# Patient Record
Sex: MALE | Race: WHITE | URBAN - NONMETROPOLITAN AREA
[De-identification: names, ages, dates, MRNs, and addresses within clinical notes are randomized per-mention and may not be internally consistent; named-entity substitution may affect disease eponyms.]

---

## 2021-08-25 ENCOUNTER — OFFICE VISIT (OUTPATIENT)
Dept: URBAN - NONMETROPOLITAN AREA CLINIC 13 | Facility: CLINIC | Age: 71
End: 2021-08-25
Payer: MEDICARE

## 2021-08-25 ENCOUNTER — WEB ENCOUNTER (OUTPATIENT)
Dept: URBAN - NONMETROPOLITAN AREA CLINIC 13 | Facility: CLINIC | Age: 71
End: 2021-08-25

## 2021-08-25 ENCOUNTER — LAB OUTSIDE AN ENCOUNTER (OUTPATIENT)
Dept: URBAN - NONMETROPOLITAN AREA CLINIC 13 | Facility: CLINIC | Age: 71
End: 2021-08-25

## 2021-08-25 VITALS
DIASTOLIC BLOOD PRESSURE: 71 MMHG | SYSTOLIC BLOOD PRESSURE: 127 MMHG | HEART RATE: 89 BPM | HEIGHT: 71 IN | BODY MASS INDEX: 26.99 KG/M2 | WEIGHT: 192.8 LBS

## 2021-08-25 DIAGNOSIS — K86.89 PANCREATIC CALCIFICATION: ICD-10-CM

## 2021-08-25 DIAGNOSIS — K65.4 MESENTERIC PANNICULITIS: ICD-10-CM

## 2021-08-25 DIAGNOSIS — Z86.010 PERSONAL HISTORY OF COLONIC POLYPS: ICD-10-CM

## 2021-08-25 DIAGNOSIS — R93.5 ABNORMAL COMPUTED TOMOGRAPHY OF ABDOMEN AND PELVIS: ICD-10-CM

## 2021-08-25 DIAGNOSIS — K76.0 HEPATIC STEATOSIS: ICD-10-CM

## 2021-08-25 PROCEDURE — 99204 OFFICE O/P NEW MOD 45 MIN: CPT | Performed by: INTERNAL MEDICINE

## 2021-08-25 RX ORDER — ATORVASTATIN CALCIUM 80 MG/1
TAKE ONE TABLET BY MOUTH ONE TIME DAILY TABLET, FILM COATED ORAL
Qty: 90 | Refills: 0 | Status: ACTIVE | COMMUNITY

## 2021-08-25 RX ORDER — TAMSULOSIN HYDROCHLORIDE 0.4 MG/1
TAKE ONE CAPSULE BY MOUTH EVERY EVENING CAPSULE ORAL
Qty: 90 | Refills: 1 | Status: ACTIVE | COMMUNITY

## 2021-08-25 RX ORDER — LISINOPRIL 10 MG/1
TAKE ONE TABLET BY MOUTH ONE TIME DAILY TABLET ORAL
Qty: 90 | Refills: 1 | Status: ACTIVE | COMMUNITY

## 2021-08-25 RX ORDER — FENOFIBRATE 160 MG/1
TAKE ONE TABLET BY MOUTH ONE TIME DAILY TABLET ORAL
Qty: 90 | Refills: 1 | Status: ACTIVE | COMMUNITY

## 2021-08-25 RX ORDER — DILTIAZEM HYDROCHLORIDE 180 MG/1
TAKE ONE CAPSULE BY MOUTH ONE TIME DAILY FOR 90 DAYS CAPSULE, EXTENDED RELEASE ORAL
Qty: 90 | Refills: 1 | Status: ACTIVE | COMMUNITY

## 2021-08-25 NOTE — HPI-TODAY'S VISIT:
2021: Initial Gastroenterology Clinic Visit   Mr. Gallegos is a 71 year old gentleman with past medical history of BPH, hypertension, hyperlipidemia, nephrolithiasis, personal history of colon polyps, perforated colon (unclear etiology) s/p colon resection, who presents for abnormal CT Abdomen/Pelvis study.   Mr. Gallegos was in his usual state of health until 2021 when he began to experience abdominal pain, abdominal fullness, bloating and losoe bowel movements.   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 This is typically a self-limiting process. Otherwise no acute inflammatory process in the abdomen or pelvis. Hepatic steatosis. There are a few punctate calcifications in the pancreatic head parenchyma which may are present sequela of chronic pancreatitis." Of note, the Radiology team did acknowledge that the findings on the CT could be secondary to an enteritis.   Following the CT scan, Mr. Gallegos was prescribed antibiotics. He notes that his abdominal symptoms have significantly improved completing the antibiotics.  In regard to the pancreatic calcifications at the pancreatic head, Mr. Gallegos denies history of pancreatitis. He denies significant alcohol use. He denies tobacco use.   His last colonoscopy was 10 years ago. He believes he did have colon polyps at that time.   He does experience reflux which is improved with as needed Tums.  He is undergoing cardiac evaluation for lightheadedness and dizziness.   He currently onws a printing company.   Prior Abdominal Imagin2021: CT Abdomen and Pelvis with Contrast IMPRESSION: 1.  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 This is typically a self-limiting process. Otherwise no acute inflammatory process in the abdomen or pelvis. 2.  Hepatic steatosis. 3.  Nonobstructing nephrolithiasis.

## 2021-08-26 LAB
A/G RATIO: 2.4
ALBUMIN: 4.6
ALKALINE PHOSPHATASE: 34
ALT (SGPT): 34
AST (SGOT): 33
BASO (ABSOLUTE): 0.1
BASOS: 1
BILIRUBIN, TOTAL: 0.3
BUN/CREATININE RATIO: 16
BUN: 25
C-REACTIVE PROTEIN, QUANT: 1
CALCIUM: 10
CARBON DIOXIDE, TOTAL: 23
CHLORIDE: 101
CREATININE: 1.58
EGFR IF AFRICN AM: 50
EGFR IF NONAFRICN AM: 43
EOS (ABSOLUTE): 0.1
EOS: 1
GLOBULIN, TOTAL: 1.9
GLUCOSE: 97
HEMATOCRIT: 39.4
HEMATOLOGY COMMENTS:: (no result)
HEMOGLOBIN: 13
IMMATURE CELLS: (no result)
IMMATURE GRANS (ABS): 0.1
IMMATURE GRANULOCYTES: 1
LYMPHS (ABSOLUTE): 2.1
LYMPHS: 24
MCH: 31.6
MCHC: 33
MCV: 96
MONOCYTES(ABSOLUTE): 0.9
MONOCYTES: 10
NEUTROPHILS (ABSOLUTE): 5.4
NEUTROPHILS: 63
NRBC: (no result)
PLATELETS: 284
POTASSIUM: 4.5
PROTEIN, TOTAL: 6.5
RBC: 4.12
RDW: 13.2
SODIUM: 139
WBC: 8.5

## 2021-09-16 ENCOUNTER — OFFICE VISIT (OUTPATIENT)
Dept: URBAN - METROPOLITAN AREA CLINIC 82 | Facility: CLINIC | Age: 71
End: 2021-09-16

## 2021-09-24 ENCOUNTER — TELEPHONE ENCOUNTER (OUTPATIENT)
Dept: URBAN - NONMETROPOLITAN AREA CLINIC 2 | Facility: CLINIC | Age: 71
End: 2021-09-24

## 2021-10-29 ENCOUNTER — OFFICE VISIT (OUTPATIENT)
Dept: URBAN - METROPOLITAN AREA MEDICAL CENTER 1 | Facility: MEDICAL CENTER | Age: 71
End: 2021-10-29
Payer: MEDICARE

## 2021-10-29 DIAGNOSIS — R93.2 ABN FIND-BILIARY TRACT: ICD-10-CM

## 2021-10-29 PROCEDURE — 43259 EGD US EXAM DUODENUM/JEJUNUM: CPT | Performed by: INTERNAL MEDICINE

## 2021-11-02 ENCOUNTER — TELEPHONE ENCOUNTER (OUTPATIENT)
Dept: URBAN - NONMETROPOLITAN AREA CLINIC 2 | Facility: CLINIC | Age: 71
End: 2021-11-02

## 2021-11-12 ENCOUNTER — LAB OUTSIDE AN ENCOUNTER (OUTPATIENT)
Dept: URBAN - NONMETROPOLITAN AREA CLINIC 2 | Facility: CLINIC | Age: 71
End: 2021-11-12

## 2021-11-12 ENCOUNTER — OFFICE VISIT (OUTPATIENT)
Dept: URBAN - NONMETROPOLITAN AREA CLINIC 2 | Facility: CLINIC | Age: 71
End: 2021-11-12
Payer: MEDICARE

## 2021-11-12 ENCOUNTER — TELEPHONE ENCOUNTER (OUTPATIENT)
Dept: URBAN - NONMETROPOLITAN AREA CLINIC 2 | Facility: CLINIC | Age: 71
End: 2021-11-12

## 2021-11-12 VITALS
SYSTOLIC BLOOD PRESSURE: 148 MMHG | BODY MASS INDEX: 25.9 KG/M2 | DIASTOLIC BLOOD PRESSURE: 76 MMHG | WEIGHT: 185 LBS | HEART RATE: 64 BPM | HEIGHT: 71 IN

## 2021-11-12 DIAGNOSIS — D3A.8 PRIMARY PANCREATIC NEUROENDOCRINE TUMOR: ICD-10-CM

## 2021-11-12 DIAGNOSIS — K76.0 HEPATIC STEATOSIS: ICD-10-CM

## 2021-11-12 DIAGNOSIS — Z86.010 PERSONAL HISTORY OF COLONIC POLYPS: ICD-10-CM

## 2021-11-12 DIAGNOSIS — K65.4 MESENTERIC PANNICULITIS: ICD-10-CM

## 2021-11-12 PROCEDURE — 99214 OFFICE O/P EST MOD 30 MIN: CPT | Performed by: INTERNAL MEDICINE

## 2021-11-12 RX ORDER — LISINOPRIL 10 MG/1
TAKE ONE TABLET BY MOUTH ONE TIME DAILY TABLET ORAL
Qty: 90 | Refills: 1 | Status: ACTIVE | COMMUNITY

## 2021-11-12 RX ORDER — FENOFIBRATE 160 MG/1
TAKE ONE TABLET BY MOUTH ONE TIME DAILY TABLET ORAL
Qty: 90 | Refills: 1 | Status: ACTIVE | COMMUNITY

## 2021-11-12 RX ORDER — ATORVASTATIN CALCIUM 80 MG/1
TAKE ONE TABLET BY MOUTH ONE TIME DAILY TABLET, FILM COATED ORAL
Qty: 90 | Refills: 0 | Status: ACTIVE | COMMUNITY

## 2021-11-12 RX ORDER — TAMSULOSIN HYDROCHLORIDE 0.4 MG/1
TAKE ONE CAPSULE BY MOUTH EVERY EVENING CAPSULE ORAL
Qty: 90 | Refills: 1 | Status: ACTIVE | COMMUNITY

## 2021-11-12 RX ORDER — POLYETHYLENE GLYCOL 3350, SODIUM SULFATE, SODIUM CHLORIDE, POTASSIUM CHLORIDE, ASCORBIC ACID, SODIUM ASCORBATE 140-9-5.2G
AS DIRECTED KIT ORAL AS DIRECTED
Qty: 280 GRAM | Refills: 0 | OUTPATIENT
Start: 2021-11-12 | End: 2021-11-13

## 2021-11-12 RX ORDER — DILTIAZEM HYDROCHLORIDE 180 MG/1
TAKE ONE CAPSULE BY MOUTH ONE TIME DAILY FOR 90 DAYS CAPSULE, EXTENDED RELEASE ORAL
Qty: 90 | Refills: 1 | Status: ACTIVE | COMMUNITY

## 2021-11-12 NOTE — HPI-TODAY'S VISIT:
2021: Initial Gastroenterology Clinic Visit     Mr. Gallegos is a 71 year old gentleman with past medical history of BPH, hypertension, hyperlipidemia, nephrolithiasis, personal history of colon polyps, perforated colon (unclear etiology) s/p colon resection, who presents for abnormal CT Abdomen/Pelvis study.   Mr. Gallegos was in his usual state of health until 2021 when he began to experience abdominal pain, abdominal fullness, bloating and loose bowel movements.   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 This is typically a self-limiting process. Otherwise no acute inflammatory process in the abdomen or pelvis. Hepatic steatosis. There are a few punctate calcifications in the pancreatic head parenchyma which may are present sequela of chronic pancreatitis." Of note, the Radiology team did acknowledge that the findings on the CT could be secondary to an enteritis.   Following the CT scan, Mr. Gallegos was prescribed antibiotics. He notes that his abdominal symptoms have significantly improved completing the antibiotics.  In regard to the pancreatic calcifications at the pancreatic head, Mr. Gallegos denies history of pancreatitis. He denies significant alcohol use. He denies tobacco use.   His last colonoscopy was 10 years ago. He believes he did have colon polyps at that time.   He does experience reflux which is improved with as needed Tums.  He is undergoing cardiac evaluation for lightheadedness and dizziness.   He currently onws a printing company.   Prior Abdominal Imagin2021: CT Abdomen and Pelvis with Contrast IMPRESSION: 1.  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 This is typically a self-limiting process. Otherwise no acute inflammatory process in the abdomen or pelvis. 2.  Hepatic steatosis.  3.  Nonobstructing nephrolithiasis.   2021: WBC 8.5, hemoglobin 13.0, hematocrit 39.4, platelets 284. Chemistry panl notable for creatinine 1.58. CRP normal.  2021: MRCP IMPRESSION: 1. 5 mm hyperenhancing lesion in the uncinate process of the pancreas is highly suspicious for a neuroendocrine tumor. This is much better seen on the CT scan from 2021 and only evident on the diffusion weighted images and fat sat T2/STIR of this MRI.    2. Hepatic steatosis. 10/29/2021: Endoscopic Ultrasound POST OP DIAGNOSIS: 1.  No  evidence of neuroendocrine tumor/mass in the visualized portions of the pancreas 2.  Nonspecific hyperechoic strands and foci without calcifications or ductal dilation of the pancreas 3.  Normal upper bile duct and pancreatic duct  2021: Gastroenterology Follow-Up Visit Mr. Gallegos's abdominal symptoms have completely resolved. He denies abdominal pain. He denies weight loss.

## 2021-11-12 NOTE — PHYSICAL EXAM SKIN:
prior surgical scars are well-healed , no jaundice present , good turgor , no masses , no tenderness on palpation

## 2021-11-16 ENCOUNTER — TELEPHONE ENCOUNTER (OUTPATIENT)
Dept: URBAN - NONMETROPOLITAN AREA CLINIC 2 | Facility: CLINIC | Age: 71
End: 2021-11-16

## 2021-11-20 ENCOUNTER — TELEPHONE ENCOUNTER (OUTPATIENT)
Dept: URBAN - NONMETROPOLITAN AREA CLINIC 2 | Facility: CLINIC | Age: 71
End: 2021-11-20

## 2021-11-20 PROBLEM — 15634181000119107: Status: ACTIVE | Noted: 2021-08-29

## 2022-01-18 ENCOUNTER — TELEPHONE ENCOUNTER (OUTPATIENT)
Dept: URBAN - NONMETROPOLITAN AREA CLINIC 2 | Facility: CLINIC | Age: 72
End: 2022-01-18

## 2022-01-18 PROBLEM — 305058001: Status: ACTIVE | Noted: 2022-01-18

## 2022-01-18 RX ORDER — LISINOPRIL 10 MG/1
TAKE ONE TABLET BY MOUTH ONE TIME DAILY TABLET ORAL
Qty: 90 | Refills: 1 | Status: ACTIVE | COMMUNITY

## 2022-01-18 RX ORDER — ATORVASTATIN CALCIUM 80 MG/1
TAKE ONE TABLET BY MOUTH ONE TIME DAILY TABLET, FILM COATED ORAL
Qty: 90 | Refills: 0 | Status: ACTIVE | COMMUNITY

## 2022-01-18 RX ORDER — FENOFIBRATE 160 MG/1
TAKE ONE TABLET BY MOUTH ONE TIME DAILY TABLET ORAL
Qty: 90 | Refills: 1 | Status: ACTIVE | COMMUNITY

## 2022-01-18 RX ORDER — POLYETHYLENE GLYCOL 3350, SODIUM SULFATE, SODIUM CHLORIDE, POTASSIUM CHLORIDE, ASCORBIC ACID, SODIUM ASCORBATE 140-9-5.2G
AS DIRECTED KIT ORAL AS DIRECTED
Qty: 280 GRAM | Refills: 0 | OUTPATIENT
Start: 2022-01-18 | End: 2022-01-19

## 2022-01-18 RX ORDER — DILTIAZEM HYDROCHLORIDE 180 MG/1
TAKE ONE CAPSULE BY MOUTH ONE TIME DAILY FOR 90 DAYS CAPSULE, EXTENDED RELEASE ORAL
Qty: 90 | Refills: 1 | Status: ACTIVE | COMMUNITY

## 2022-01-18 RX ORDER — TAMSULOSIN HYDROCHLORIDE 0.4 MG/1
TAKE ONE CAPSULE BY MOUTH EVERY EVENING CAPSULE ORAL
Qty: 90 | Refills: 1 | Status: ACTIVE | COMMUNITY

## 2022-01-19 ENCOUNTER — TELEPHONE ENCOUNTER (OUTPATIENT)
Dept: URBAN - NONMETROPOLITAN AREA CLINIC 2 | Facility: CLINIC | Age: 72
End: 2022-01-19

## 2022-01-20 ENCOUNTER — OFFICE VISIT (OUTPATIENT)
Dept: URBAN - NONMETROPOLITAN AREA SURGERY CENTER 1 | Facility: SURGERY CENTER | Age: 72
End: 2022-01-20
Payer: MEDICARE

## 2022-01-20 ENCOUNTER — CLAIMS CREATED FROM THE CLAIM WINDOW (OUTPATIENT)
Dept: URBAN - METROPOLITAN AREA CLINIC 4 | Facility: CLINIC | Age: 72
End: 2022-01-20
Payer: MEDICARE

## 2022-01-20 DIAGNOSIS — D12.2 ADENOMA OF ASCENDING COLON: ICD-10-CM

## 2022-01-20 DIAGNOSIS — K63.89 BACTERIAL OVERGROWTH SYNDROME: ICD-10-CM

## 2022-01-20 DIAGNOSIS — Z86.010 ADENOMAS PERSONAL HISTORY OF COLONIC POLYPS: ICD-10-CM

## 2022-01-20 DIAGNOSIS — D12.2 BENIGN NEOPLASM OF ASCENDING COLON: ICD-10-CM

## 2022-01-20 DIAGNOSIS — K63.89 POLYP, HYPERPLASTIC: ICD-10-CM

## 2022-01-20 DIAGNOSIS — D12.3 BENIGN NEOPLASM OF TRANSVERSE COLON: ICD-10-CM

## 2022-01-20 DIAGNOSIS — D12.3 ADENOMA OF TRANSVERSE COLON: ICD-10-CM

## 2022-01-20 PROCEDURE — 88305 TISSUE EXAM BY PATHOLOGIST: CPT | Performed by: PATHOLOGY

## 2022-01-20 PROCEDURE — 45380 COLONOSCOPY AND BIOPSY: CPT | Performed by: INTERNAL MEDICINE

## 2022-01-20 PROCEDURE — G8907 PT DOC NO EVENTS ON DISCHARG: HCPCS | Performed by: INTERNAL MEDICINE

## 2022-01-20 PROCEDURE — 45385 COLONOSCOPY W/LESION REMOVAL: CPT | Performed by: INTERNAL MEDICINE

## 2022-01-20 RX ORDER — LISINOPRIL 10 MG/1
TAKE ONE TABLET BY MOUTH ONE TIME DAILY TABLET ORAL
Qty: 90 | Refills: 1 | Status: ACTIVE | COMMUNITY

## 2022-01-20 RX ORDER — TAMSULOSIN HYDROCHLORIDE 0.4 MG/1
TAKE ONE CAPSULE BY MOUTH EVERY EVENING CAPSULE ORAL
Qty: 90 | Refills: 1 | Status: ACTIVE | COMMUNITY

## 2022-01-20 RX ORDER — DILTIAZEM HYDROCHLORIDE 180 MG/1
TAKE ONE CAPSULE BY MOUTH ONE TIME DAILY FOR 90 DAYS CAPSULE, EXTENDED RELEASE ORAL
Qty: 90 | Refills: 1 | Status: ACTIVE | COMMUNITY

## 2022-01-20 RX ORDER — ATORVASTATIN CALCIUM 80 MG/1
TAKE ONE TABLET BY MOUTH ONE TIME DAILY TABLET, FILM COATED ORAL
Qty: 90 | Refills: 0 | Status: ACTIVE | COMMUNITY

## 2022-01-20 RX ORDER — FENOFIBRATE 160 MG/1
TAKE ONE TABLET BY MOUTH ONE TIME DAILY TABLET ORAL
Qty: 90 | Refills: 1 | Status: ACTIVE | COMMUNITY

## 2022-01-31 ENCOUNTER — OFFICE VISIT (OUTPATIENT)
Dept: URBAN - NONMETROPOLITAN AREA CLINIC 2 | Facility: CLINIC | Age: 72
End: 2022-01-31
Payer: MEDICARE

## 2022-01-31 VITALS
DIASTOLIC BLOOD PRESSURE: 73 MMHG | BODY MASS INDEX: 25.9 KG/M2 | HEIGHT: 71 IN | WEIGHT: 185 LBS | SYSTOLIC BLOOD PRESSURE: 158 MMHG | HEART RATE: 90 BPM

## 2022-01-31 DIAGNOSIS — K65.4 MESENTERIC PANNICULITIS: ICD-10-CM

## 2022-01-31 DIAGNOSIS — D3A.8 PRIMARY PANCREATIC NEUROENDOCRINE TUMOR: ICD-10-CM

## 2022-01-31 DIAGNOSIS — Z86.010 PERSONAL HISTORY OF COLONIC POLYPS: ICD-10-CM

## 2022-01-31 DIAGNOSIS — K76.0 HEPATIC STEATOSIS: ICD-10-CM

## 2022-01-31 PROBLEM — 717919005: Status: ACTIVE | Noted: 2021-09-24

## 2022-01-31 PROBLEM — 1092381000119100: Status: ACTIVE | Noted: 2021-08-29

## 2022-01-31 PROBLEM — 197321007: Status: ACTIVE | Noted: 2021-08-29

## 2022-01-31 PROCEDURE — 99214 OFFICE O/P EST MOD 30 MIN: CPT | Performed by: INTERNAL MEDICINE

## 2022-01-31 RX ORDER — DILTIAZEM HYDROCHLORIDE 180 MG/1
TAKE ONE CAPSULE BY MOUTH ONE TIME DAILY FOR 90 DAYS CAPSULE, EXTENDED RELEASE ORAL
Qty: 90 | Refills: 1 | Status: ACTIVE | COMMUNITY

## 2022-01-31 RX ORDER — FENOFIBRATE 160 MG/1
TAKE ONE TABLET BY MOUTH ONE TIME DAILY TABLET ORAL
Qty: 90 | Refills: 1 | Status: ACTIVE | COMMUNITY

## 2022-01-31 RX ORDER — ATORVASTATIN CALCIUM 80 MG/1
TAKE ONE TABLET BY MOUTH ONE TIME DAILY TABLET, FILM COATED ORAL
Qty: 90 | Refills: 0 | Status: ACTIVE | COMMUNITY

## 2022-01-31 RX ORDER — LISINOPRIL 10 MG/1
TAKE ONE TABLET BY MOUTH ONE TIME DAILY TABLET ORAL
Qty: 90 | Refills: 1 | Status: ACTIVE | COMMUNITY

## 2022-01-31 RX ORDER — TAMSULOSIN HYDROCHLORIDE 0.4 MG/1
TAKE ONE CAPSULE BY MOUTH EVERY EVENING CAPSULE ORAL
Qty: 90 | Refills: 1 | Status: ACTIVE | COMMUNITY

## 2022-01-31 RX ORDER — POLYETHYLENE GLYCOL 3350, SODIUM SULFATE, SODIUM CHLORIDE, POTASSIUM CHLORIDE, ASCORBIC ACID, SODIUM ASCORBATE 140-9-5.2G
AS DIRECTED KIT ORAL AS DIRECTED
Qty: 280 GRAM | Refills: 0 | OUTPATIENT
Start: 2022-01-31 | End: 2022-02-01

## 2022-01-31 NOTE — HPI-TODAY'S VISIT:
2021: Initial Gastroenterology Clinic Visit        Mr. Gallegos is a 71 year old gentleman with past medical history of BPH, hypertension, hyperlipidemia, nephrolithiasis, personal history of colon polyps, perforated colon (unclear etiology) s/p colon resection, who presents for abnormal CT Abdomen/Pelvis study.   Mr. Gallegos was in his usual state of health until 2021 when he began to experience abdominal pain, abdominal fullness, bloating and loose bowel movements.   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 This is typically a self-limiting process. Otherwise no acute inflammatory process in the abdomen or pelvis. Hepatic steatosis. There are a few punctate calcifications in the pancreatic head parenchyma which may are present sequela of chronic pancreatitis." Of note, the Radiology team did acknowledge that the findings on the CT could be secondary to an enteritis.   Following the CT scan, Mr. Gallegos was prescribed antibiotics. He notes that his abdominal symptoms have significantly improved completing the antibiotics.  In regard to the pancreatic calcifications at the pancreatic head, Mr. Gallegos denies history of pancreatitis. He denies significant alcohol use. He denies tobacco use.   His last colonoscopy was 10 years ago. He believes he did have colon polyps at that time.   He does experience reflux which is improved with as needed Tums.  He is undergoing cardiac evaluation for lightheadedness and dizziness.   He currently owns a printing company.   Prior Abdominal Imagin2021: CT Abdomen and Pelvis with Contrast IMPRESSION: 1.  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 This is typically a self-limiting process. Otherwise no acute inflammatory process in the abdomen or pelvis. 2.  Hepatic steatosis.  3.  Nonobstructing nephrolithiasis.   2021: WBC 8.5, hemoglobin 13.0, hematocrit 39.4, platelets 284. Chemistry panel notable for creatinine 1.58. CRP normal.  2021: MRCP IMPRESSION: 1. 5 mm hyperenhancing lesion in the uncinate process of the pancreas is highly suspicious for a neuroendocrine tumor. This is much better seen on the CT scan from 2021 and only evident on the diffusion weighted images and fat sat T2/STIR of this MRI.  2. Hepatic steatosis.  10/29/2021: Endoscopic Ultrasound POST OP DIAGNOSIS: 1.  No  evidence of neuroendocrine tumor/mass in the visualized portions of the pancreas 2.  Nonspecific hyperechoic strands and foci without calcifications or ductal dilation of the pancreas 3.  Normal upper bile duct and pancreatic duct  2021: Gastroenterology Follow-Up Visit Mr. Gallegos's abdominal symptoms have completely resolved. He denies abdominal pain. He denies weight loss.  2021: Octreotide Scan IMPRESSION: 1.  Intense radiotracer uptake in the uncinate process consistent with pancreatic neuroendocrine tumor. 2.  No evidence of metastatic disease. 2022: Colonoscopy  Evidence of ileocolonic anastomosis in the ascending colon.  Four sessile polyps in the ascending colon measuring 3 to 6 mm in size. Polypectomy performed. Seven sessile polyps were found in the transverse colon measuring 4 to 7 mm. Polypectomy performed, Given the length of the procedure due to the number of polyps present, there was significant spasm limiting evaluation of parts of the colonic mucosa.  2022: Pathology from Colonoscopy  (A) Colon, Transverse, Polypectomy: TUBULAR ADENOMA(S). (B) Colon, Ascending, Polypectomy: TUBULAR ADENOMA(S). BENIGN MUCOSAL POLYP(S). See Comment. Negative for Dysplasia or Malignancy. COMMENT: We use "benign mucosal polyp" to refer to an endoscopically polypoid lesion that shows no dysplastic or hyperplastic epithelium or classic features of hamartomatous polyp.  2022: Gastroenterology Follow-Up Visit Mr. Gallegos denies abdominal pain. He has follow-up for his pancreatic neuroendocrine tumor with his pancreas surgery team with CT.

## 2022-01-31 NOTE — PHYSICAL EXAM SKIN:
surgical scar on abdomen appears well-healed , no jaundice present , good turgor , no masses , no tenderness on palpation

## 2022-01-31 NOTE — PHYSICAL EXAM GASTROINTESTINAL
Abdomen , Scar located on abdomen , soft, nontender, nondistended , no guarding or rigidity , no masses palpable , normal bowel sounds , Liver and Spleen , no hepatomegaly present , no hepatosplenomegaly , liver nontender , spleen not palpable

## 2022-05-09 ENCOUNTER — OFFICE VISIT (OUTPATIENT)
Dept: URBAN - NONMETROPOLITAN AREA SURGERY CENTER 1 | Facility: SURGERY CENTER | Age: 72
End: 2022-05-09

## 2022-06-09 ENCOUNTER — OFFICE VISIT (OUTPATIENT)
Dept: URBAN - NONMETROPOLITAN AREA SURGERY CENTER 1 | Facility: SURGERY CENTER | Age: 72
End: 2022-06-09

## 2022-06-14 ENCOUNTER — TELEPHONE ENCOUNTER (OUTPATIENT)
Dept: URBAN - NONMETROPOLITAN AREA CLINIC 2 | Facility: CLINIC | Age: 72
End: 2022-06-14

## 2022-06-20 ENCOUNTER — OFFICE VISIT (OUTPATIENT)
Dept: URBAN - NONMETROPOLITAN AREA CLINIC 2 | Facility: CLINIC | Age: 72
End: 2022-06-20

## 2022-07-12 PROBLEM — 428283002: Status: ACTIVE | Noted: 2021-08-29

## 2022-08-17 ENCOUNTER — OFFICE VISIT (OUTPATIENT)
Dept: URBAN - NONMETROPOLITAN AREA SURGERY CENTER 1 | Facility: SURGERY CENTER | Age: 72
End: 2022-08-17
Payer: MEDICARE

## 2022-08-17 ENCOUNTER — CLAIMS CREATED FROM THE CLAIM WINDOW (OUTPATIENT)
Dept: URBAN - METROPOLITAN AREA CLINIC 4 | Facility: CLINIC | Age: 72
End: 2022-08-17
Payer: MEDICARE

## 2022-08-17 DIAGNOSIS — K63.89 OTHER SPECIFIED DISEASES OF INTESTINE: ICD-10-CM

## 2022-08-17 DIAGNOSIS — D12.4 BENIGN NEOPLASM OF DESCENDING COLON: ICD-10-CM

## 2022-08-17 DIAGNOSIS — Z86.010 ADENOMAS PERSONAL HISTORY OF COLONIC POLYPS: ICD-10-CM

## 2022-08-17 DIAGNOSIS — D12.4 ADENOMA OF DESCENDING COLON: ICD-10-CM

## 2022-08-17 PROCEDURE — G8907 PT DOC NO EVENTS ON DISCHARG: HCPCS | Performed by: INTERNAL MEDICINE

## 2022-08-17 PROCEDURE — 45385 COLONOSCOPY W/LESION REMOVAL: CPT | Performed by: INTERNAL MEDICINE

## 2022-08-17 PROCEDURE — 88305 TISSUE EXAM BY PATHOLOGIST: CPT | Performed by: PATHOLOGY

## 2022-08-17 RX ORDER — FENOFIBRATE 160 MG/1
TAKE ONE TABLET BY MOUTH ONE TIME DAILY TABLET ORAL
Qty: 90 | Refills: 1 | Status: ACTIVE | COMMUNITY

## 2022-08-17 RX ORDER — ATORVASTATIN CALCIUM 80 MG/1
TAKE ONE TABLET BY MOUTH ONE TIME DAILY TABLET, FILM COATED ORAL
Qty: 90 | Refills: 0 | Status: ACTIVE | COMMUNITY

## 2022-08-17 RX ORDER — LISINOPRIL 10 MG/1
TAKE ONE TABLET BY MOUTH ONE TIME DAILY TABLET ORAL
Qty: 90 | Refills: 1 | Status: ACTIVE | COMMUNITY

## 2022-08-17 RX ORDER — DILTIAZEM HYDROCHLORIDE 180 MG/1
TAKE ONE CAPSULE BY MOUTH ONE TIME DAILY FOR 90 DAYS CAPSULE, EXTENDED RELEASE ORAL
Qty: 90 | Refills: 1 | Status: ACTIVE | COMMUNITY

## 2022-08-17 RX ORDER — TAMSULOSIN HYDROCHLORIDE 0.4 MG/1
TAKE ONE CAPSULE BY MOUTH EVERY EVENING CAPSULE ORAL
Qty: 90 | Refills: 1 | Status: ACTIVE | COMMUNITY

## 2023-05-11 ENCOUNTER — OFFICE VISIT (OUTPATIENT)
Dept: URBAN - NONMETROPOLITAN AREA CLINIC 13 | Facility: CLINIC | Age: 73
End: 2023-05-11

## 2023-06-14 ENCOUNTER — OFFICE VISIT (OUTPATIENT)
Dept: URBAN - NONMETROPOLITAN AREA CLINIC 2 | Facility: CLINIC | Age: 73
End: 2023-06-14
Payer: MEDICARE

## 2023-06-14 ENCOUNTER — DASHBOARD ENCOUNTERS (OUTPATIENT)
Age: 73
End: 2023-06-14

## 2023-06-14 VITALS
SYSTOLIC BLOOD PRESSURE: 142 MMHG | BODY MASS INDEX: 25.76 KG/M2 | WEIGHT: 184 LBS | TEMPERATURE: 98.2 F | DIASTOLIC BLOOD PRESSURE: 73 MMHG | HEART RATE: 73 BPM | HEIGHT: 71 IN

## 2023-06-14 DIAGNOSIS — R19.7 ACUTE DIARRHEA: ICD-10-CM

## 2023-06-14 DIAGNOSIS — D3A.8 PRIMARY PANCREATIC NEUROENDOCRINE TUMOR: ICD-10-CM

## 2023-06-14 DIAGNOSIS — Z86.010 PERSONAL HISTORY OF COLONIC POLYPS: ICD-10-CM

## 2023-06-14 DIAGNOSIS — Z87.19 HX SBO: ICD-10-CM

## 2023-06-14 PROCEDURE — 99214 OFFICE O/P EST MOD 30 MIN: CPT | Performed by: NURSE PRACTITIONER

## 2023-06-14 RX ORDER — TAMSULOSIN HYDROCHLORIDE 0.4 MG/1
TAKE ONE CAPSULE BY MOUTH EVERY EVENING CAPSULE ORAL
Qty: 90 | Refills: 1 | Status: ACTIVE | COMMUNITY

## 2023-06-14 RX ORDER — FENOFIBRATE 160 MG/1
TAKE ONE TABLET BY MOUTH ONE TIME DAILY TABLET ORAL
Qty: 90 | Refills: 1 | Status: ACTIVE | COMMUNITY

## 2023-06-14 RX ORDER — LISINOPRIL 10 MG/1
TAKE ONE TABLET BY MOUTH ONE TIME DAILY TABLET ORAL
Qty: 90 | Refills: 1 | Status: ACTIVE | COMMUNITY

## 2023-06-14 RX ORDER — DILTIAZEM HYDROCHLORIDE 180 MG/1
TAKE ONE CAPSULE BY MOUTH ONE TIME DAILY FOR 90 DAYS CAPSULE, EXTENDED RELEASE ORAL
Qty: 90 | Refills: 1 | Status: ACTIVE | COMMUNITY

## 2023-06-14 RX ORDER — ATORVASTATIN CALCIUM 80 MG/1
TAKE ONE TABLET BY MOUTH ONE TIME DAILY TABLET, FILM COATED ORAL
Qty: 90 | Refills: 0 | Status: ACTIVE | COMMUNITY

## 2023-06-14 NOTE — HPI-TODAY'S VISIT:
Phu is a pleasant 73-year-old male who presents to discuss loose stool as well as SBO.  He has a history of colon polyps and is seeing Dr. Hargrove and had 2 colonoscopies last year with a total of 18 polyps between the 2 of them with a mix of TA and hyperplastic.  He is due for repeat colonoscopy December 2023.  For the last 3 months, has been having a lot of issues with diarrhea typically between 3-4 times a day.  Occasional nocturnal complaints.  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 Oconnor is watching.  He sees him again later on in the summer for repeat imaging.  Since he started having the diarrhea, he was on his way to [place] and took an Imodium.  Shortly after this about a day or 2 later, he started developing some pretty intense left-sided abdominal pain and was unable to pass gas or stool.  He was seen at a facility there and had a CT scan confirming SBO.  This was managed conservatively.  Since that time, he has not had an Imodium.  He does have a history of a colon perforation related to an appendix versus a chicken bone and has had a right colectomy.  Does not seem to have a lot of pain with the loose stool. Sb

## 2023-06-15 LAB
(TTG) AB, IGA: <1
(TTG) AB, IGG: <1
A/G RATIO: 1.6
ALBUMIN: 4.1
ALKALINE PHOSPHATASE: 42
ALT (SGPT): 15
ANTIGLIADIN ABS, IGA: <1
AST (SGOT): 17
BILIRUBIN, TOTAL: 0.4
BUN/CREATININE RATIO: 12
BUN: 17
C-REACTIVE PROTEIN, QUANT: 45.6
CALCIUM: 9.7
CARBON DIOXIDE, TOTAL: 24
CHLORIDE: 108
CREATININE: 1.38
EGFR: 54
GLIADIN (DEAMIDATED) AB (IGA): <1
GLIADIN (DEAMIDATED) AB (IGG): <1
GLOBULIN, TOTAL: 2.5
GLUCOSE: 116
IMMUNOGLOBULIN A: 184
POTASSIUM: 4.3
PROTEIN, TOTAL: 6.6
SED RATE BY MODIFIED: 29
SODIUM: 141
T-TRANSGLUTAMINASE (TTG) IGA: <1
TSH: 1.72

## 2023-06-19 ENCOUNTER — WEB ENCOUNTER (OUTPATIENT)
Dept: URBAN - NONMETROPOLITAN AREA CLINIC 2 | Facility: CLINIC | Age: 73
End: 2023-06-19

## 2023-06-23 ENCOUNTER — LAB OUTSIDE AN ENCOUNTER (OUTPATIENT)
Dept: URBAN - NONMETROPOLITAN AREA CLINIC 2 | Facility: CLINIC | Age: 73
End: 2023-06-23

## 2023-06-27 ENCOUNTER — TELEPHONE ENCOUNTER (OUTPATIENT)
Dept: URBAN - NONMETROPOLITAN AREA CLINIC 2 | Facility: CLINIC | Age: 73
End: 2023-06-27

## 2023-06-27 LAB
ADENOVIRUS F 40/41: NOT DETECTED
CALPROTECTIN, STOOL - QDX: (no result)
CAMPYLOBACTER: NOT DETECTED
CLOSTRIDIUM DIFFICILE: NOT DETECTED
ENTAMOEBA HISTOLYTICA: NOT DETECTED
ENTEROAGGREGATIVE E.COLI: NOT DETECTED
ENTEROTOXIGENIC E.COLI: NOT DETECTED
ESCHERICHIA COLI O157: NOT DETECTED
GIARDIA LAMBLIA: NOT DETECTED
NOROVIRUS GI/GII: NOT DETECTED
PANCREATICELASTASE ELISA, STOOL: (no result)
ROTAVIRUS A: NOT DETECTED
SALMONELLA SPP.: NOT DETECTED
SHIGA-LIKE TOXIN PRODUCING E.COLI: NOT DETECTED
SHIGELLA SPP. / ENTEROINVASIVE E.COLI: NOT DETECTED
VIBRIO PARAHAEMOLYTICUS: NOT DETECTED
VIBRIO SPP.: NOT DETECTED
YERSINIA ENTEROCOLITICA: NOT DETECTED

## 2023-09-14 ENCOUNTER — OFFICE VISIT (OUTPATIENT)
Dept: URBAN - NONMETROPOLITAN AREA CLINIC 2 | Facility: CLINIC | Age: 73
End: 2023-09-14